# Patient Record
Sex: MALE | Race: WHITE | NOT HISPANIC OR LATINO | Employment: FULL TIME | URBAN - METROPOLITAN AREA
[De-identification: names, ages, dates, MRNs, and addresses within clinical notes are randomized per-mention and may not be internally consistent; named-entity substitution may affect disease eponyms.]

---
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
## 2023-04-19 ENCOUNTER — WALK IN (OUTPATIENT)
Dept: URGENT CARE | Age: 23
End: 2023-04-19
Attending: EMERGENCY MEDICINE

## 2023-04-19 VITALS
HEART RATE: 79 BPM | TEMPERATURE: 98.6 F | SYSTOLIC BLOOD PRESSURE: 124 MMHG | OXYGEN SATURATION: 98 % | WEIGHT: 190 LBS | RESPIRATION RATE: 16 BRPM | DIASTOLIC BLOOD PRESSURE: 79 MMHG

## 2023-04-19 DIAGNOSIS — B00.1 HERPES LABIALIS: Primary | ICD-10-CM

## 2023-04-19 PROCEDURE — 99202 OFFICE O/P NEW SF 15 MIN: CPT

## 2023-04-19 RX ORDER — PENCICLOVIR 10 MG/G
CREAM TOPICAL
Qty: 1.5 G | Refills: 0 | Status: SHIPPED | OUTPATIENT
Start: 2023-04-19

## 2023-04-19 RX ORDER — VALACYCLOVIR HYDROCHLORIDE 1 G/1
2000 TABLET, FILM COATED ORAL 2 TIMES DAILY
Qty: 4 TABLET | Refills: 0 | Status: SHIPPED | OUTPATIENT
Start: 2023-04-19 | End: 2023-04-20

## 2023-04-19 ASSESSMENT — PAIN SCALES - GENERAL
PAINLEVEL: 0
PAINLEVEL_OUTOF10: 0

## 2023-04-19 ASSESSMENT — ENCOUNTER SYMPTOMS
BRUISES/BLEEDS EASILY: 0
CHILLS: 0
ABDOMINAL PAIN: 0
SHORTNESS OF BREATH: 0
COUGH: 0
DIARRHEA: 0
HEADACHES: 0
BACK PAIN: 0
VOMITING: 0
FEVER: 0
SORE THROAT: 0
DIZZINESS: 0

## 2023-06-27 ENCOUNTER — WALK IN (OUTPATIENT)
Dept: URGENT CARE | Age: 23
End: 2023-06-27
Attending: EMERGENCY MEDICINE

## 2023-06-27 VITALS
TEMPERATURE: 98 F | OXYGEN SATURATION: 98 % | SYSTOLIC BLOOD PRESSURE: 125 MMHG | RESPIRATION RATE: 16 BRPM | DIASTOLIC BLOOD PRESSURE: 79 MMHG | HEART RATE: 78 BPM

## 2023-06-27 DIAGNOSIS — B00.1 HERPES SIMPLEX LABIALIS: Primary | ICD-10-CM

## 2023-06-27 PROCEDURE — 99212 OFFICE O/P EST SF 10 MIN: CPT

## 2023-06-27 RX ORDER — ACYCLOVIR 50 MG/G
CREAM TOPICAL
Qty: 5 G | Refills: 0 | Status: SHIPPED | OUTPATIENT
Start: 2023-06-27

## 2023-06-27 RX ORDER — VALACYCLOVIR HYDROCHLORIDE 1 G/1
1000 TABLET, FILM COATED ORAL 3 TIMES DAILY
Qty: 21 TABLET | Refills: 0 | Status: SHIPPED | OUTPATIENT
Start: 2023-06-27 | End: 2023-07-04

## 2023-06-27 ASSESSMENT — ENCOUNTER SYMPTOMS
SHORTNESS OF BREATH: 0
COUGH: 0
VOMITING: 0
ABDOMINAL PAIN: 0
CHILLS: 0
HEADACHES: 0
BRUISES/BLEEDS EASILY: 0
FEVER: 0
DIZZINESS: 0
SORE THROAT: 0
BACK PAIN: 0
DIARRHEA: 0

## 2023-11-03 ENCOUNTER — HOSPITAL ENCOUNTER (OUTPATIENT)
Age: 23
Discharge: HOME | End: 2023-11-03
Payer: COMMERCIAL

## 2023-11-03 VITALS
SYSTOLIC BLOOD PRESSURE: 126 MMHG | TEMPERATURE: 97 F | OXYGEN SATURATION: 96 % | HEART RATE: 84 BPM | HEIGHT: 70 IN | DIASTOLIC BLOOD PRESSURE: 73 MMHG | BODY MASS INDEX: 27.2 KG/M2 | RESPIRATION RATE: 16 BRPM | WEIGHT: 190 LBS

## 2023-11-03 DIAGNOSIS — U07.1 COVID-19: Primary | ICD-10-CM

## 2023-11-03 LAB
POCT INFLUENZA A: NEGATIVE
POCT INFLUENZA B: NEGATIVE
S PYO AG THROAT QL IA.RAPID: NEGATIVE
SARS-COV-2 RNA RESP QL NAA+PROBE: DETECTED

## 2023-11-03 PROCEDURE — 99204 OFFICE O/P NEW MOD 45 MIN: CPT

## 2023-11-03 PROCEDURE — 99202 OFFICE O/P NEW SF 15 MIN: CPT

## 2023-11-03 PROCEDURE — 87502 INFLUENZA DNA AMP PROBE: CPT | Performed by: NURSE PRACTITIONER

## 2023-11-03 PROCEDURE — 87651 STREP A DNA AMP PROBE: CPT | Performed by: NURSE PRACTITIONER

## 2023-11-03 RX ORDER — MULTIVITAMIN
1 TABLET ORAL DAILY
COMMUNITY

## 2023-11-03 NOTE — ED INITIAL ASSESSMENT (HPI)
Yesterday, c/o sore throat, headache, nausea, body aches, sinus congestion. Denies fevers. Took IBU 2 hours ago.

## 2024-10-28 ENCOUNTER — HOSPITAL ENCOUNTER (OUTPATIENT)
Age: 24
Discharge: HOME | End: 2024-10-28
Payer: COMMERCIAL

## 2024-10-28 ENCOUNTER — APPOINTMENT (OUTPATIENT)
Dept: GENERAL RADIOLOGY | Age: 24
End: 2024-10-28
Attending: NURSE PRACTITIONER
Payer: COMMERCIAL

## 2024-10-28 VITALS
HEART RATE: 65 BPM | DIASTOLIC BLOOD PRESSURE: 72 MMHG | SYSTOLIC BLOOD PRESSURE: 132 MMHG | OXYGEN SATURATION: 97 % | RESPIRATION RATE: 18 BRPM | TEMPERATURE: 97 F

## 2024-10-28 DIAGNOSIS — S69.91XA INJURY OF RIGHT HAND, INITIAL ENCOUNTER: Primary | ICD-10-CM

## 2024-10-28 DIAGNOSIS — S62.339A CLOSED BOXER'S FRACTURE, INITIAL ENCOUNTER: ICD-10-CM

## 2024-10-28 PROCEDURE — A4565 SLINGS: HCPCS | Performed by: NURSE PRACTITIONER

## 2024-10-28 PROCEDURE — 99213 OFFICE O/P EST LOW 20 MIN: CPT | Performed by: NURSE PRACTITIONER

## 2024-10-28 PROCEDURE — 73130 X-RAY EXAM OF HAND: CPT | Performed by: NURSE PRACTITIONER

## 2024-10-28 PROCEDURE — 29125 APPL SHORT ARM SPLINT STATIC: CPT | Performed by: NURSE PRACTITIONER

## 2024-10-28 NOTE — ED INITIAL ASSESSMENT (HPI)
Patient was playing with his dog at home and came down on his coffee table basically punching the table. He has pain to the 4th and 5th knuckles of his right hand.

## 2024-10-29 ENCOUNTER — TELEPHONE (OUTPATIENT)
Dept: ORTHOPEDICS CLINIC | Facility: CLINIC | Age: 24
End: 2024-10-29

## 2024-10-29 NOTE — ED PROVIDER NOTES
Patient Seen in: [facility]      History     Chief Complaint   Patient presents with    Hand Injury     Stated Complaint: right knuckle injury    Subjective:   HPI  24-year-old male presents to the immediate care with a right hand injury 2 weeks ago.  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  No numbness or ting.  Denies any wrist or elbow pain.  Patient has no other concerns this time.  The patient's medication list, past medical history and social history elements as listed in today's nurse's notes were reviewed and agreed (except as otherwise stated in the HPI).  The patient's family history reviewed and determined to be noncontributory to the presenting problem.      Objective:     History reviewed. No pertinent past medical history.           History reviewed. No pertinent surgical history.             Social History     Socioeconomic History    Marital status: Single   Tobacco Use    Smoking status: Never     Passive exposure: Never    Smokeless tobacco: Never   Vaping Use    Vaping status: Never Used   Substance and Sexual Activity    Alcohol use: No    Drug use: No    Sexual activity: Never     Social Drivers of Health      [facility], [facility]    Housing Stability              Review of Systems    Positive for stated complaint: right knuckle injury  Other systems are as noted in HPI.  Constitutional and vital signs reviewed.      All other systems reviewed and negative except as noted above.    Physical Exam     ED Triage Vitals [10/28/24 1835]   /83   Pulse 65   Resp 18   Temp 97.3 °F (36.3 °C)   Temp src Temporal   SpO2 97 %   O2 Device None (Room air)       Current Vitals:   Vital Signs  BP: 132/72  (manual recheck)  Pulse: 65  Resp: 18  Temp: 97.3 °F (36.3 °C)  Temp src: Temporal    Oxygen Therapy  SpO2: 97 %  O2 Device: None (Room air)        Physical Exam  GENERAL: well developed, well nourished,in no apparent distress  LUNGS: No respiratory distress  CARDIO: No tachycardia  EXTREMITIES: no cyanosis, clubbing or edema  Exam of r Hand -->   - swelling: yes noted over the right fifth MCP joint  - deformity/defect: no   - crepitus: no   - ecchymosis/bruising: yes   - Tenderness over: yes   - Range of motion: limited  - Tendons intact: yes   - Radial pulses: normal  - Cap refill: normal  - sensation: intact   - Motor exam/strength/hand : decreased due to pain         ED Course   Labs Reviewed - No data to display    XR HAND (MIN 3 VIEWS), RIGHT (CPT=73130)    Result Date: 10/28/2024  PROCEDURE:  XR HAND (MIN 3 VIEWS), RIGHT (CPT=73130)  TECHNIQUE:  Three views of the right hand were obtained.  COMPARISON:  None.  INDICATIONS:  right knuckle injury  PATIENT STATED HISTORY: (As transcribed by Technologist)  The patient accidently hit his hand on a coffee table. Pain to the area of the fifth metacarpal joint.    FINDINGS:  There is a mildly displaced possibly comminuted fracture of the distal 5th meta carpal (boxer's fracture).  Otherwise osseous structures are intact.  Joint spaces are maintained.  No radiopaque foreign body.            CONCLUSION:  See above   LOCATION:  [facility]   Dictated by (CST): Julio Mcdaniels MD on 10/28/2024 at 7:30 PM     Finalized by (CST): Julio Mcdaniels MD on 10/28/2024 at 7:30 PM        Ulnar gutter splint applied with sling upon reevaluation no neurovascular compromise is noted     MDM   Pertinent Labs & Imaging studies reviewed. (See chart for details)  Differential diagnosis considered but not limited to: Boxer fracture, contusion, sprain hand injury  Patient coming in with head injury.   Radiology see above. Will treat for possible boxer fracture. Will discharge on ulnar gutter  splint orthopedic follow-up. Patient is comfortable with this plan.  Overall Pt looks good. Non-toxic, well-hydrated and in no respiratory distress. Vital signs are reassuring. Exam is reassuring. I do not believe pt  requires and additional  diagnostic studiesor intervention. I believe pt  can be discharged home to continue evaluation as an outpatient. Follow-up provider given. Discharge instructions given and reviewed. Return for any problems. All understand and agreewith the plan.    Please note that this report has been produced using speech recognition software and may contain errors related to that system including, but not limited to, errors in grammar, punctuation, and spelling, as well as words and phrases that possibly may have been recognized inappropriately.  If there are any questions or concerns, contact the dictating provider for clarification.    Note to patient: The 21st Century Cures Act makes medical notes like these available to patients in the interest of transparency. However, this is a medical document intended as peer to peer communication. It is written in medical language and may contain abbreviations or verbiage that are unfamiliar. It may appear blunt or direct. Medical documents are intended to carry relevant information, facts as evident, and the clinical opinion of the practitioner.           Medical Decision Making      Disposition and Plan     Clinical Impression:  1. Injury of right hand, initial encounter    2. Closed boxer's fracture, initial encounter         Disposition:  Discharge  10/28/2024  7:15 pm    Follow-up:  Neville Kim MD  [address] [place] [id number]  [phone]                Medications Prescribed:  Discharge Medication List as of 10/28/2024  7:28 PM              Supplementary Documentation:

## 2024-10-29 NOTE — DISCHARGE INSTRUCTIONS
Follow-up with orthopedics call their office tomorrow morning for follow-up appointment  Ice to the hand ice 20 minutes every couple hours  Wear the splint for support and comfort do not take the splint off until seen by Ortho  Tylenol and Motrin for pain  Return to the emergency room if any worse symptoms or concerns

## 2024-10-29 NOTE — TELEPHONE ENCOUNTER
Patient is scheduled for right hand fx. X-rays in [facility]. Patient is in [employer] and is going to try to ask for time off for his scheduled appointment, he is wanting to know if any other providers could see him later in the afternoon on any day of the week for this injury. Please advise  Future Appointments   Date Time Provider Department Center   11/1/2024  8:00 AM Matt Cabral MD [facility] [address]

## 2024-11-01 ENCOUNTER — OFFICE VISIT (OUTPATIENT)
Facility: CLINIC | Age: 24
End: 2024-11-01
Payer: COMMERCIAL

## 2024-11-01 VITALS — BODY MASS INDEX: 27.2 KG/M2 | WEIGHT: 190 LBS | HEIGHT: 70 IN

## 2024-11-01 DIAGNOSIS — S62.336A CLOSED DISPLACED FRACTURE OF NECK OF FIFTH METACARPAL BONE OF RIGHT HAND, INITIAL ENCOUNTER: Primary | ICD-10-CM

## 2024-11-01 PROCEDURE — 99204 OFFICE O/P NEW MOD 45 MIN: CPT | Performed by: STUDENT IN AN ORGANIZED HEALTH CARE EDUCATION/TRAINING PROGRAM

## 2024-11-01 PROCEDURE — 3008F BODY MASS INDEX DOCD: CPT | Performed by: STUDENT IN AN ORGANIZED HEALTH CARE EDUCATION/TRAINING PROGRAM

## 2024-11-01 NOTE — H&P
Clinic Note     Allergies[1]    CC: Right small finger injury    DOI: 10/20    HPI: This 24 year old RHD male presents with an injury to the Right small finger on the above date. The injury was sustained when the patient's hand struck a table while playing with his dog.   The patient noted immediate pain and swelling and went to an outside facility. While there the patient was found to have a boxer's fracture. They were placed in a splint and subsequently referred to our hand surgery clinic.     Pain Level: mild    Occupation:     History reviewed. No pertinent past medical history.  History reviewed. No pertinent surgical history.  Current Outpatient Medications   Medication Sig Dispense Refill    Multiple Vitamin Oral Tab Take 1 tablet by mouth daily. (Patient not taking: Reported on 10/28/2024)       History reviewed. No pertinent family history.  Social History     Occupational History    Not on file   Tobacco Use    Smoking status: Never     Passive exposure: Never    Smokeless tobacco: Never   Vaping Use    Vaping status: Never Used   Substance and Sexual Activity    Alcohol use: No    Drug use: No    Sexual activity: Never        Review of Systems:  Negative unless stated in HPI.    Physical Exam:    Right upper extremity:    Skin clean and dry.   No lacerations, no abrasions.  No erythema   Mild to moderate edema about the small finger metacarpal shaft/head.   Able to make a near full fist and open fingers all the way.   There is no evidence of malrotation.  No tenderness in anatomic snuffbox, in ulnar fovea, over dorsal scapholunate interosseous ligament, lateral epicondyle, or over distal pole of the scaphoid.    Mild tenderness to palpation over the small finger metacarpal neck.  Absence of prominent 5th MC knuckle when attempting to make a fist  Subtle extensor lag of the small finger  FDS and FDP intact, full ROM limited by pain and swelling of the digit  Brisk capillary refill less  than 2 seconds in all digits, bilaterally.    Fingers warm and well perfused  Neurologic:   Sensation intact to light touch light touch in the radial, ulnar, median distributions.   Sensation present to light touch at the radial and ulnar aspect of the small finger  Motor intact to AIN, PIN, Ulnar motor nerve distributions  Able to extend digits    Diagnostic Studies:  I reviewed the images independently from the professional interpretation, and discussed my interpretation of the pertinent findings with patient/family.    Xrays of Right hand 3V: On my independent review of the radiographs, there is evidence of a displaced small finger metacarpal neck fracture with moderate volar angulation.     Assessment/Plan:  24 year old male with Right small finger metacarpal neck fracture    I reviewed the patient's electronic medical record, including the pertinent office notes, medical/surgical history, medications and images. I specifically reviewed the images noted above, independently and discussed my independent interpretation of these images in combination with the pertinent positive and negative findings in my clinical exam with the patient    Right small finger metacarpal neck fracture    The nature of the condition was discussed with the patient today including reviewing the xrays. The risks/benefits, pros/cons and reasonable expectations of treatment options were also discussed today, including surgical treatment indications. Education and counselling was given for the above diagnosis.  We discussed both operative and non-operative management. Discussed with the patient that despite the volar angulation of the fracture people tend to do very well with non-operative treatment. After a thorough discussion the patient did opt for non-operative treatment. Patient elected to proceed with ulnar gutter bracing NWB for the next 2 weeks, followed by ulnar gutter bracing 5lb weight restriction for the following 2 weeks, and  finally weaning out of the brace after that. No restrictions from 8 weeks onward     Follow Up: 4 weeks, ok to cancel if doing well    Matt Cabral MD   Hand, Wrist, & Elbow Surgery  [email or web address]         [1] No Known Allergies

## 2025-02-05 ENCOUNTER — HOSPITAL ENCOUNTER (OUTPATIENT)
Age: 25
Discharge: HOME | End: 2025-02-05
Payer: COMMERCIAL

## 2025-02-05 VITALS
HEART RATE: 71 BPM | DIASTOLIC BLOOD PRESSURE: 64 MMHG | SYSTOLIC BLOOD PRESSURE: 125 MMHG | WEIGHT: 194 LBS | OXYGEN SATURATION: 98 % | BODY MASS INDEX: 27.16 KG/M2 | HEIGHT: 71 IN | RESPIRATION RATE: 18 BRPM | TEMPERATURE: 99 F

## 2025-02-05 DIAGNOSIS — J10.1 INFLUENZA A: Primary | ICD-10-CM

## 2025-02-05 LAB
POCT INFLUENZA A: POSITIVE
POCT INFLUENZA B: NEGATIVE

## 2025-02-05 PROCEDURE — 99213 OFFICE O/P EST LOW 20 MIN: CPT | Performed by: NURSE PRACTITIONER

## 2025-02-05 PROCEDURE — 87502 INFLUENZA DNA AMP PROBE: CPT | Performed by: NURSE PRACTITIONER

## 2025-02-05 RX ORDER — OSELTAMIVIR PHOSPHATE 75 MG/1
75 CAPSULE ORAL 2 TIMES DAILY
Qty: 10 CAPSULE | Refills: 0 | Status: SHIPPED | OUTPATIENT
Start: 2025-02-05 | End: 2025-02-10

## 2025-02-05 NOTE — ED PROVIDER NOTES
Patient Seen in: [facility]      History     Chief Complaint   Patient presents with    Sore Throat    Headache    Cough/URI    Body ache and/or chills    Nasal Congestion     Stated Complaint: flu symptoms    Subjective:   HPI      Patient is a pleasant 24-year-old male with no significant past medical history, here for evaluation of sore throat, decreased appetite, chills and bodyaches.  Symptoms initially started on Monday, 2 days ago.  Patient did have a known flu exposure from work.  Patient is a .  Denies shortness of breath, chest pain, abdominal pain or diarrhea.    Objective:     History reviewed. No pertinent past medical history.           History reviewed. No pertinent surgical history.             Social History     Socioeconomic History    Marital status: Single   Tobacco Use    Smoking status: Never     Passive exposure: Never    Smokeless tobacco: Never   Vaping Use    Vaping status: Never Used   Substance and Sexual Activity    Alcohol use: No    Drug use: No    Sexual activity: Never     Social Drivers of Health      [facility], [facility]    Housing Stability              Review of Systems    Positive for stated complaint: flu symptoms  Other systems are as noted in HPI.  Constitutional and vital signs reviewed.      All other systems reviewed and negative except as noted above.    Physical Exam     ED Triage Vitals [02/05/25 0849]   /64   Pulse 71   Resp 18   Temp 99 °F (37.2 °C)   Temp src Oral   SpO2 98 %   O2 Device None (Room air)       Current Vitals:   Vital Signs  BP: 125/64  Pulse: 71  Resp: 18  Temp: 99 °F (37.2 °C)  Temp src: Oral    Oxygen Therapy  SpO2: 98 %  O2 Device: None (Room air)        Physical Exam  Vitals and nursing note reviewed.   Constitutional:       General: He is not in acute distress.     Appearance: He is well-developed. He is not ill-appearing, toxic-appearing or diaphoretic.    HENT:      Right Ear: Tympanic membrane and ear canal normal.      Left Ear: Tympanic membrane and ear canal normal.      Mouth/Throat:      Mouth: Mucous membranes are moist. Mucous membranes are pale. No oral lesions.      Pharynx: Uvula midline. Posterior oropharyngeal erythema present. No pharyngeal swelling, oropharyngeal exudate or uvula swelling.      Tonsils: No tonsillar exudate.   Eyes:      Conjunctiva/sclera: Conjunctivae normal.      Pupils: Pupils are equal, round, and reactive to light.   Cardiovascular:      Rate and Rhythm: Normal rate and regular rhythm.      Heart sounds: Normal heart sounds.   Pulmonary:      Effort: Pulmonary effort is normal.      Breath sounds: Normal breath sounds.   Lymphadenopathy:      Cervical: No cervical adenopathy.   Neurological:      Mental Status: He is alert.         ED Course     Labs Reviewed   POCT FLU TEST - Abnormal; Notable for the following components:       Result Value    POCT INFLUENZA A Positive (*)     All other components within normal limits    Narrative:     This assay is a rapid molecular in vitro test utilizing nucleic acid amplification of influenza A and B viral RNA.               MDM            Medical Decision Making  Differentials include but are not limited to viral URI including COVID-19 and influenza versus strep pharyngitis.  Positive rapid flu A here today.  Patient specifically requests Tamiflu.  Will start antiviral.  Encouraged use of over-the-counter medication for symptom management in conjunction with antiviral.  Patient agrees.    Patient is answered to patient's satisfaction    Amount and/or Complexity of Data Reviewed  Labs: ordered. Decision-making details documented in ED Course.        Disposition and Plan     Clinical Impression:  1. Influenza A         Disposition:  Discharge  2/5/2025  9:14 am    Follow-up:  No follow-up provider specified.        Medications Prescribed:  Discharge Medication List as of 2/5/2025  9:16 AM         START taking these medications    Details   oseltamivir 75 MG Oral Cap Take 1 capsule (75 mg total) by mouth 2 (two) times daily for 5 days., Normal, Disp-10 capsule, R-0                 Supplementary Documentation:

## 2025-02-05 NOTE — ED INITIAL ASSESSMENT (HPI)
Pt with cough sorethroat loss of appetite chills, bodyaches. First day of sx on Monday.known flu exposure.

## (undated) NOTE — LETTER
Date: 11/1/2024    Patient Name: Celio Vasquez          To Whom it may concern:    This letter has been written at the patient's request. The above patient was seen at [facility] for treatment of a medical condition.    Celio may return to physical activity wearing his brace, with the following restrictions:    The first 2 weeks he should be non weightbearing with the right hand. He may take the brace off at rest, and mobility exercises.    The next 2 weeks he may begin to lift up to 5lbs with his brace off.    He will then wean out of the brace.        Sincerely,    Matt Cabral MD

## (undated) NOTE — LETTER
Date & Time: 2/5/2025, 9:10 AM  Patient: Celio Vasquez  Encounter Provider(s):    Mary Hamilton APRN       To Whom It May Concern:    Celio Vasquez was seen and treated in our department on 2/5/2025. He should not return to work until fever free for 24 hours without the use of fever reducing medications .    If you have any questions or concerns, please do not hesitate to call.        _____________________________  Physician/APC Signature

## (undated) NOTE — LETTER
Date & Time: 10/28/2024, 7:36 PM  Patient: Celio Vasquez  Encounter Provider(s):    Alonso Clements APRN       To Whom It May Concern:    Celio Vasquez was seen and treated in our department on 10/28/2024. He can return to work with alternate duty to avoid further injury to hand until cleared by ortho.    If you have any questions or concerns, please do not hesitate to call.        _____________________________  Physician/APC Signature